# Patient Record
Sex: MALE | Race: BLACK OR AFRICAN AMERICAN | NOT HISPANIC OR LATINO | Employment: FULL TIME | URBAN - METROPOLITAN AREA
[De-identification: names, ages, dates, MRNs, and addresses within clinical notes are randomized per-mention and may not be internally consistent; named-entity substitution may affect disease eponyms.]

---

## 2022-07-22 ENCOUNTER — HOSPITAL ENCOUNTER (EMERGENCY)
Facility: HOSPITAL | Age: 32
Discharge: HOME | End: 2022-07-22
Attending: EMERGENCY MEDICINE

## 2022-07-22 VITALS
TEMPERATURE: 100.9 F | OXYGEN SATURATION: 95 % | SYSTOLIC BLOOD PRESSURE: 131 MMHG | RESPIRATION RATE: 17 BRPM | HEART RATE: 89 BPM | DIASTOLIC BLOOD PRESSURE: 84 MMHG

## 2022-07-22 DIAGNOSIS — R50.9 FEVER: Primary | ICD-10-CM

## 2022-07-22 DIAGNOSIS — U07.1 COVID-19: ICD-10-CM

## 2022-07-22 DIAGNOSIS — Z87.768 HISTORY OF DIGITAL CLUBBING: ICD-10-CM

## 2022-07-22 LAB
FLUAV RNA RESP QL NAA+PROBE: NEGATIVE
FLUBV RNA RESP QL NAA+PROBE: NEGATIVE
RSV RNA RESP QL NAA+PROBE: NEGATIVE
SARS-COV-2 RNA RESP QL NAA+PROBE: POSITIVE

## 2022-07-22 PROCEDURE — 0241U HB NFCT DS VIR RESP RNA 4 TRGT: CPT | Performed by: EMERGENCY MEDICINE

## 2022-07-22 PROCEDURE — 99283 EMERGENCY DEPT VISIT LOW MDM: CPT

## 2022-07-22 PROCEDURE — 99284 EMERGENCY DEPT VISIT MOD MDM: CPT | Performed by: EMERGENCY MEDICINE

## 2022-07-22 RX ORDER — IBUPROFEN 600 MG/1
600 TABLET ORAL ONCE
Status: COMPLETED | OUTPATIENT
Start: 2022-07-22 | End: 2022-07-22

## 2022-07-22 RX ADMIN — IBUPROFEN 600 MG: 600 TABLET, FILM COATED ORAL at 17:49

## 2022-07-22 NOTE — DISCHARGE INSTRUCTIONS
You were noted to have digital clubbing on your fingers on exam   You should follow-up with the PCP for further evaluation  Your COVID test is pending  He will be called with results

## 2022-07-22 NOTE — Clinical Note
Annie Jovel was seen and treated in our emergency department on 7/22/2022  Diagnosis:     Nick    He may return on this date: 08/01/2022    Suspected covid (test pending)  May return to work on 8/1/2022 if covid test positive  Otherwise may return sooner once fevers resolve     If you have any questions or concerns, please don't hesitate to call        Aniyah Villagran MD    ______________________________           _______________          _______________  Hospital Representative                              Date                                Time

## 2022-07-22 NOTE — ED PROVIDER NOTES
History  Chief Complaint   Patient presents with    URI     Pt presents to Ed with c/o chills, headache, congestion starting this morning, unknown exposure to covid  Denies cp, or sob     The patient reports that he woke today with fevers, headache, myalgias, nasal congestion, and cough  He notes that he has not been vaccinated for COVID  No sick contacts  He does me/medical checkup was about 3 years ago  He denies any medical problems including any renal dysfunction or pulmonary disease  Patient tells me he has had swollen finger tips throughout his entire life  He has attributed this to boxing  He 1st noted it and grandma school  His fingers are not appreciably changed  He has never been evaluated by a phsyician  for this  Patient tells me is worse symptoms his headache  He is not nauseous  He is not confused  He is not taking any medications  No alleviating or exacerbating factors  Symptoms are moderate intensity and constant  None       History reviewed  No pertinent past medical history  History reviewed  No pertinent surgical history  History reviewed  No pertinent family history  I have reviewed and agree with the history as documented  E-Cigarette/Vaping    E-Cigarette Use Never User      E-Cigarette/Vaping Substances     Social History     Tobacco Use    Smoking status: Never Smoker    Smokeless tobacco: Never Used   Vaping Use    Vaping Use: Never used   Substance Use Topics    Alcohol use: Never    Drug use: Never       Review of Systems   All other systems reviewed and are negative  Physical Exam  Physical Exam  Vitals and nursing note reviewed  Constitutional:       Appearance: He is well-developed  HENT:      Head: Normocephalic and atraumatic  Eyes:      Conjunctiva/sclera: Conjunctivae normal    Cardiovascular:      Rate and Rhythm: Normal rate and regular rhythm  Heart sounds: No murmur heard    Pulmonary:      Effort: Pulmonary effort is normal  No respiratory distress  Breath sounds: Normal breath sounds  Abdominal:      Palpations: Abdomen is soft  Tenderness: There is no abdominal tenderness  Musculoskeletal:      Cervical back: Neck supple  Comments: Digital clubbing noted on finger taps   Skin:     General: Skin is warm and dry  Neurological:      Mental Status: He is alert  Vital Signs  ED Triage Vitals   Temperature Pulse Respirations Blood Pressure SpO2   07/22/22 1728 07/22/22 1726 07/22/22 1726 07/22/22 1727 07/22/22 1727   (!) 100 9 °F (38 3 °C) 89 17 131/84 95 %      Temp Source Heart Rate Source Patient Position - Orthostatic VS BP Location FiO2 (%)   07/22/22 1726 07/22/22 1726 07/22/22 1726 07/22/22 1726 --   Oral Monitor Lying Left arm       Pain Score       07/22/22 1749       10 - Worst Possible Pain           Vitals:    07/22/22 1726 07/22/22 1727   BP:  131/84   Pulse: 89    Patient Position - Orthostatic VS: Lying          Visual Acuity      ED Medications  Medications   ibuprofen (MOTRIN) tablet 600 mg (600 mg Oral Given 7/22/22 1749)       Diagnostic Studies  Results Reviewed     Procedure Component Value Units Date/Time    FLU/RSV/COVID - if FLU/RSV clinically relevant [[id number]]  (Abnormal) Collected: 07/22/22 1749    Lab Status: Final result Specimen: Nares from Nose Updated: 07/22/22 1833     SARS-CoV-2 Positive     INFLUENZA A PCR Negative     INFLUENZA B PCR Negative     RSV PCR Negative    Narrative:      FOR PEDIATRIC PATIENTS - copy/paste COVID Guidelines URL to browser: [email or web address]  [employer]    SARS-CoV-2 assay is a Nucleic Acid Amplification assay intended for the  qualitative detection of nucleic acid from SARS-CoV-2 in nasopharyngeal  swabs  Results are for the presumptive identification of SARS-CoV-2 RNA      Positive results are indicative of infection with SARS-CoV-2, the virus  causing COVID-19, but do not rule out bacterial infection or co-infection  with other viruses  Laboratories within the [place] and its  territories are required to report all positive results to the appropriate  public health authorities  Negative results do not preclude SARS-CoV-2  infection and should not be used as the sole basis for treatment or other  patient management decisions  Negative results must be combined with  clinical observations, patient history, and epidemiological information  This test has not been FDA cleared or approved  This test has been authorized by FDA under an Emergency Use Authorization  (EUA)  This test is only authorized for the duration of time the  declaration that circumstances exist justifying the authorization of the  emergency use of an in vitro diagnostic tests for detection of SARS-CoV-2  virus and/or diagnosis of COVID-19 infection under section 564(b)(1) of  the Act, 21 U  S C  597HAH-7(W)(0), unless the authorization is terminated  or revoked sooner  The test has been validated but independent review by FDA  and CLIA is pending  Test performed using [employer] GeneXpert: This RT-PCR assay targets N2,  a region unique to SARS-CoV-2  A conserved region in the E-gene was chosen  for pan-Sarbecovirus detection which includes SARS-CoV-2  No orders to display              Procedures  Procedures         ED Course  ED Course as of 07/22/22 1852 Fri Jul 22, 2022 1851 I reviewed results and called patient  Patient did not answer phone but previously requested I leave message with results and understood plan and prescribed paxlovid to his preferred pharmacy  Prescription sent a message left  MDM  Number of Diagnoses or Management Options  COVID-19  Fever  History of digital clubbing  Diagnosis management comments: Evaluate the patient  He is nontoxic and does not clinically appear septic  Will await COVID test before prescribing paxlovid         Amount and/or Complexity of Data Reviewed  Clinical lab tests: ordered and reviewed        Disposition  Final diagnoses:   Fever   History of digital clubbing   COVID-19     Time reflects when diagnosis was documented in both MDM as applicable and the Disposition within this note     Time User Action Codes Description Comment    7/22/2022  5:43 PM Claire Jointer [R50 9] Fever     7/22/2022  5:43 PM Lady Jones Add [Z87 76] History of digital clubbing     7/22/2022  6:39 PM Lady Jones Add [U07 1] COVID-19       ED Disposition     ED Disposition   Discharge    Condition   Stable    Date/Time   Fri Jul 22, 2022  5:43 PM    Comment   [place] discharge to home/self care  Follow-up Information     Follow up With Specialties Details Why Contact Info    Your PCP in [place]              There are no discharge medications for this patient  No discharge procedures on file      PDMP Review     None          ED Provider  Electronically Signed by           Leslie Causey MD  07/22/22 8772